# Patient Record
Sex: FEMALE | Race: WHITE | NOT HISPANIC OR LATINO | Employment: FULL TIME | RURAL
[De-identification: names, ages, dates, MRNs, and addresses within clinical notes are randomized per-mention and may not be internally consistent; named-entity substitution may affect disease eponyms.]

---

## 2020-10-19 ENCOUNTER — HISTORICAL (OUTPATIENT)
Dept: ADMINISTRATIVE | Facility: HOSPITAL | Age: 30
End: 2020-10-19

## 2020-10-19 LAB — SARS-COV+SARS-COV-2 AG RESP QL IA.RAPID: NEGATIVE

## 2020-10-21 LAB
REPORT: NORMAL

## 2021-01-22 ENCOUNTER — HISTORICAL (OUTPATIENT)
Dept: ADMINISTRATIVE | Facility: HOSPITAL | Age: 31
End: 2021-01-22

## 2021-01-22 LAB
25(OH)D3 SERPL-MCNC: 25 NG/ML
ALBUMIN SERPL BCP-MCNC: 4.1 G/DL (ref 3.5–5)
ALBUMIN/GLOB SERPL: 1.1 {RATIO}
ALP SERPL-CCNC: 65 U/L (ref 37–98)
ALT SERPL W P-5'-P-CCNC: 25 U/L (ref 13–56)
ANION GAP SERPL CALCULATED.3IONS-SCNC: 7 MMOL/L (ref 7–16)
AST SERPL W P-5'-P-CCNC: 20 U/L (ref 15–37)
BASOPHILS # BLD AUTO: 0.03 X10E3/UL (ref 0–0.2)
BASOPHILS NFR BLD AUTO: 0.5 % (ref 0–1)
BILIRUB SERPL-MCNC: 0.3 MG/DL (ref 0–1.2)
BUN SERPL-MCNC: 13 MG/DL (ref 7–18)
BUN/CREAT SERPL: 14
CALCIUM SERPL-MCNC: 8.8 MG/DL (ref 8.5–10.1)
CHLORIDE SERPL-SCNC: 111 MMOL/L (ref 98–107)
CHOLEST SERPL-MCNC: 141 MG/DL
CO2 SERPL-SCNC: 29 MMOL/L (ref 21–32)
CREAT SERPL-MCNC: 0.91 MG/DL (ref 0.5–1.02)
EOSINOPHIL # BLD AUTO: 0.06 X10E3/UL (ref 0–0.5)
EOSINOPHIL NFR BLD AUTO: 1 % (ref 1–4)
ERYTHROCYTE [DISTWIDTH] IN BLOOD BY AUTOMATED COUNT: 11.9 % (ref 11.5–14.5)
GLOBULIN SER-MCNC: 3.6 G/DL (ref 2–4)
GLUCOSE SERPL-MCNC: 78 MG/DL (ref 74–106)
HCT VFR BLD AUTO: 43.8 % (ref 38–47)
HDLC SERPL-MCNC: 60 MG/DL
HGB BLD-MCNC: 14.3 G/DL (ref 12–16)
IMM GRANULOCYTES # BLD AUTO: 0.02 X10E3/UL (ref 0–0.04)
IMM GRANULOCYTES NFR BLD: 0.3 % (ref 0–0.4)
LDLC SERPL CALC-MCNC: 67 MG/DL
LYMPHOCYTES # BLD AUTO: 1.68 X10E3/UL (ref 1–4.8)
LYMPHOCYTES NFR BLD AUTO: 28 % (ref 27–41)
MCH RBC QN AUTO: 32.6 PG (ref 27–31)
MCHC RBC AUTO-ENTMCNC: 32.6 G/DL (ref 32–36)
MCV RBC AUTO: 99.8 FL (ref 80–96)
MONOCYTES # BLD AUTO: 0.34 X10E3/UL (ref 0–0.8)
MONOCYTES NFR BLD AUTO: 5.7 % (ref 2–6)
MPC BLD CALC-MCNC: 11.1 FL (ref 9.4–12.4)
NEUTROPHILS # BLD AUTO: 3.86 X10E3/UL (ref 1.8–7.7)
NEUTROPHILS NFR BLD AUTO: 64.5 % (ref 53–65)
NONHDLC SERPL-MCNC: 81 MG/DL
NRBC # BLD AUTO: 0 X10E3/UL (ref 0–0)
NRBC, AUTO (.00): 0 /100 (ref 0–0)
PLATELET # BLD AUTO: 264 X10E3/UL (ref 150–400)
POTASSIUM SERPL-SCNC: 4.3 MMOL/L (ref 3.5–5.1)
PROT SERPL-MCNC: 7.7 G/DL (ref 6.4–8.2)
RBC # BLD AUTO: 4.39 X10E6/UL (ref 4.2–5.4)
SODIUM SERPL-SCNC: 143 MMOL/L (ref 136–145)
TRIGL SERPL-MCNC: 71 MG/DL
TSH SERPL DL<=0.005 MIU/L-ACNC: 0.91 UIU/ML (ref 0.36–3.74)
VIT B12 SERPL-MCNC: 415 PG/ML (ref 193–986)
VLDLC SERPL-MCNC: 14 MG/DL
WBC # BLD AUTO: 5.99 X10E3/UL (ref 4.5–11)

## 2021-11-04 ENCOUNTER — INFUSION (OUTPATIENT)
Dept: INFECTIOUS DISEASES | Facility: HOSPITAL | Age: 31
End: 2021-11-04
Attending: NURSE PRACTITIONER
Payer: COMMERCIAL

## 2021-11-04 VITALS
SYSTOLIC BLOOD PRESSURE: 126 MMHG | OXYGEN SATURATION: 99 % | BODY MASS INDEX: 30.12 KG/M2 | HEART RATE: 90 BPM | DIASTOLIC BLOOD PRESSURE: 88 MMHG | HEIGHT: 63 IN | WEIGHT: 170 LBS | RESPIRATION RATE: 18 BRPM | TEMPERATURE: 98 F

## 2021-11-04 DIAGNOSIS — U07.1 COVID-19 VIRUS INFECTION: Primary | ICD-10-CM

## 2021-11-04 PROCEDURE — 63600175 PHARM REV CODE 636 W HCPCS: Performed by: NURSE PRACTITIONER

## 2021-11-04 PROCEDURE — M0243 CASIRIVI AND IMDEVI INFUSION: HCPCS | Performed by: NURSE PRACTITIONER

## 2021-11-04 PROCEDURE — 25000003 PHARM REV CODE 250: Performed by: NURSE PRACTITIONER

## 2021-11-04 RX ORDER — ALBUTEROL SULFATE 90 UG/1
2 AEROSOL, METERED RESPIRATORY (INHALATION)
Status: DISCONTINUED | OUTPATIENT
Start: 2021-11-04 | End: 2024-02-08

## 2021-11-04 RX ORDER — EPINEPHRINE 0.3 MG/.3ML
0.3 INJECTION SUBCUTANEOUS
Status: DISCONTINUED | OUTPATIENT
Start: 2021-11-04 | End: 2024-02-08

## 2021-11-04 RX ORDER — ONDANSETRON 4 MG/1
4 TABLET, ORALLY DISINTEGRATING ORAL ONCE AS NEEDED
Status: DISCONTINUED | OUTPATIENT
Start: 2021-11-04 | End: 2024-02-08

## 2021-11-04 RX ORDER — DIPHENHYDRAMINE HYDROCHLORIDE 50 MG/ML
25 INJECTION INTRAMUSCULAR; INTRAVENOUS ONCE AS NEEDED
Status: DISCONTINUED | OUTPATIENT
Start: 2021-11-04 | End: 2024-02-08

## 2021-11-04 RX ORDER — BUPROPION HYDROCHLORIDE 300 MG/1
300 TABLET ORAL DAILY
COMMUNITY
End: 2023-09-28 | Stop reason: SDUPTHER

## 2021-11-04 RX ORDER — SODIUM CHLORIDE 0.9 % (FLUSH) 0.9 %
10 SYRINGE (ML) INJECTION
Status: DISCONTINUED | OUTPATIENT
Start: 2021-11-04 | End: 2024-02-08

## 2021-11-04 RX ORDER — ACETAMINOPHEN 325 MG/1
650 TABLET ORAL ONCE AS NEEDED
Status: DISCONTINUED | OUTPATIENT
Start: 2021-11-04 | End: 2024-02-08

## 2021-11-04 RX ADMIN — CASIRIVIMAB AND IMDEVIMAB 600 MG: 600; 600 INJECTION, SOLUTION, CONCENTRATE INTRAVENOUS at 03:11

## 2021-12-09 ENCOUNTER — HOSPITAL ENCOUNTER (OUTPATIENT)
Dept: RADIOLOGY | Facility: HOSPITAL | Age: 31
Discharge: HOME | End: 2021-12-09
Attending: OBSTETRICS & GYNECOLOGY
Payer: COMMERCIAL

## 2021-12-09 VITALS — BODY MASS INDEX: 31.01 KG/M2 | WEIGHT: 175 LBS | HEIGHT: 63 IN

## 2021-12-09 DIAGNOSIS — N63.0 BREAST LUMP: ICD-10-CM

## 2021-12-09 PROCEDURE — 76641 ULTRASOUND BREAST COMPLETE: CPT | Mod: 26,LT,, | Performed by: RADIOLOGY

## 2021-12-09 PROCEDURE — 77066 DX MAMMO INCL CAD BI: CPT | Mod: 26,,, | Performed by: RADIOLOGY

## 2021-12-09 PROCEDURE — 77066 MAMMO DIGITAL DIAGNOSTIC BILAT: ICD-10-PCS | Mod: 26,,, | Performed by: RADIOLOGY

## 2021-12-09 PROCEDURE — 76641 ULTRASOUND BREAST COMPLETE: CPT | Mod: TC,50

## 2021-12-09 PROCEDURE — 76641 PR US BREAST COMPLETE UNILAT: ICD-10-PCS | Mod: 26,RT,, | Performed by: RADIOLOGY

## 2021-12-09 PROCEDURE — 77066 DX MAMMO INCL CAD BI: CPT | Mod: TC

## 2021-12-09 PROCEDURE — 76641 ULTRASOUND BREAST COMPLETE: CPT | Mod: 26,RT,, | Performed by: RADIOLOGY

## 2023-09-06 DIAGNOSIS — Z01.818 PREOP TESTING: Primary | ICD-10-CM

## 2023-09-28 RX ORDER — BUPROPION HYDROCHLORIDE 300 MG/1
300 TABLET ORAL DAILY
Qty: 90 TABLET | Refills: 3 | Status: SHIPPED | OUTPATIENT
Start: 2023-09-28

## 2023-11-14 RX ORDER — AMOXICILLIN AND CLAVULANATE POTASSIUM 875; 125 MG/1; MG/1
1 TABLET, FILM COATED ORAL EVERY 12 HOURS
Qty: 14 TABLET | Refills: 0 | Status: SHIPPED | OUTPATIENT
Start: 2023-11-14 | End: 2023-11-21

## 2023-12-12 ENCOUNTER — OFFICE VISIT (OUTPATIENT)
Dept: OBSTETRICS AND GYNECOLOGY | Facility: CLINIC | Age: 33
End: 2023-12-12
Payer: COMMERCIAL

## 2023-12-12 VITALS
HEIGHT: 63 IN | WEIGHT: 198 LBS | OXYGEN SATURATION: 100 % | SYSTOLIC BLOOD PRESSURE: 112 MMHG | DIASTOLIC BLOOD PRESSURE: 78 MMHG | RESPIRATION RATE: 18 BRPM | BODY MASS INDEX: 35.08 KG/M2 | HEART RATE: 87 BPM

## 2023-12-12 DIAGNOSIS — E66.9 CLASS 2 OBESITY WITH BODY MASS INDEX (BMI) OF 35.0 TO 35.9 IN ADULT, UNSPECIFIED OBESITY TYPE, UNSPECIFIED WHETHER SERIOUS COMORBIDITY PRESENT: Primary | ICD-10-CM

## 2023-12-12 DIAGNOSIS — Z71.3 DIETARY COUNSELING AND SURVEILLANCE: ICD-10-CM

## 2023-12-12 DIAGNOSIS — R63.5 WEIGHT GAIN: ICD-10-CM

## 2023-12-12 PROCEDURE — 99214 OFFICE O/P EST MOD 30 MIN: CPT | Mod: PBBFAC | Performed by: ADVANCED PRACTICE MIDWIFE

## 2023-12-12 PROCEDURE — 1159F PR MEDICATION LIST DOCUMENTED IN MEDICAL RECORD: ICD-10-PCS | Mod: ,,, | Performed by: ADVANCED PRACTICE MIDWIFE

## 2023-12-12 PROCEDURE — 1159F MED LIST DOCD IN RCRD: CPT | Mod: ,,, | Performed by: ADVANCED PRACTICE MIDWIFE

## 2023-12-12 PROCEDURE — 99203 PR OFFICE/OUTPT VISIT, NEW, LEVL III, 30-44 MIN: ICD-10-PCS | Mod: S$PBB,,, | Performed by: ADVANCED PRACTICE MIDWIFE

## 2023-12-12 PROCEDURE — 3074F SYST BP LT 130 MM HG: CPT | Mod: ,,, | Performed by: ADVANCED PRACTICE MIDWIFE

## 2023-12-12 PROCEDURE — 3008F PR BODY MASS INDEX (BMI) DOCUMENTED: ICD-10-PCS | Mod: ,,, | Performed by: ADVANCED PRACTICE MIDWIFE

## 2023-12-12 PROCEDURE — 3008F BODY MASS INDEX DOCD: CPT | Mod: ,,, | Performed by: ADVANCED PRACTICE MIDWIFE

## 2023-12-12 PROCEDURE — 3074F PR MOST RECENT SYSTOLIC BLOOD PRESSURE < 130 MM HG: ICD-10-PCS | Mod: ,,, | Performed by: ADVANCED PRACTICE MIDWIFE

## 2023-12-12 PROCEDURE — 99203 OFFICE O/P NEW LOW 30 MIN: CPT | Mod: S$PBB,,, | Performed by: ADVANCED PRACTICE MIDWIFE

## 2023-12-12 PROCEDURE — 3078F DIAST BP <80 MM HG: CPT | Mod: ,,, | Performed by: ADVANCED PRACTICE MIDWIFE

## 2023-12-12 PROCEDURE — 3078F PR MOST RECENT DIASTOLIC BLOOD PRESSURE < 80 MM HG: ICD-10-PCS | Mod: ,,, | Performed by: ADVANCED PRACTICE MIDWIFE

## 2023-12-12 RX ORDER — PHENTERMINE HYDROCHLORIDE 37.5 MG/1
37.5 TABLET ORAL
Qty: 30 TABLET | Refills: 0 | Status: SHIPPED | OUTPATIENT
Start: 2023-12-12 | End: 2024-01-11

## 2023-12-12 RX ORDER — FLECAINIDE ACETATE 50 MG/1
50 TABLET ORAL 2 TIMES DAILY
COMMUNITY
Start: 2023-09-13 | End: 2024-09-12

## 2023-12-12 RX ORDER — METOPROLOL SUCCINATE 50 MG/1
50 TABLET, EXTENDED RELEASE ORAL
COMMUNITY
Start: 2023-09-13 | End: 2024-09-12

## 2023-12-12 RX ORDER — PHENTERMINE HYDROCHLORIDE 37.5 MG/1
37.5 TABLET ORAL
Qty: 30 TABLET | Refills: 0 | Status: CANCELLED | OUTPATIENT
Start: 2023-12-12 | End: 2024-01-11

## 2023-12-12 NOTE — PROGRESS NOTES
Subjective     Patient ID: Genoveva Pate is a 33 y.o. female.    Chief Complaint: Weight Loss (In for Adipex. )    C/O weight gain and requesting Adipex for weight mgt  Has taken in the past without complications  Pt discussed with her cardiologist and he okayed her taking Adipex      Review of Systems   Constitutional: Negative.    HENT: Negative.     Eyes: Negative.    Respiratory: Negative.     Cardiovascular: Negative.    Gastrointestinal: Negative.    Endocrine: Negative.    Genitourinary: Negative.    Musculoskeletal: Negative.    Integumentary:  Negative.   Allergic/Immunologic: Negative.    Neurological: Negative.    Psychiatric/Behavioral: Negative.            Objective     Physical Exam  Vitals reviewed.   Constitutional:       Appearance: Normal appearance. She is obese.   HENT:      Head: Normocephalic.   Cardiovascular:      Rate and Rhythm: Normal rate and regular rhythm.   Pulmonary:      Effort: Pulmonary effort is normal.      Breath sounds: Normal breath sounds.   Abdominal:      General: Abdomen is flat.      Palpations: Abdomen is soft.   Musculoskeletal:         General: Normal range of motion.      Cervical back: Normal range of motion.   Skin:     General: Skin is warm and dry.   Neurological:      Mental Status: She is alert and oriented to person, place, and time.   Psychiatric:         Mood and Affect: Mood normal.         Behavior: Behavior normal.            Assessment and Plan     1. Class 2 obesity with body mass index (BMI) of 35.0 to 35.9 in adult, unspecified obesity type, unspecified whether serious comorbidity present    2. Weight gain    3. Dietary counseling and surveillance    Other orders  -     phentermine (ADIPEX-P) 37.5 mg tablet; Take 1 tablet (37.5 mg total) by mouth before breakfast.  Dispense: 30 tablet; Refill: 0        Discussed diet/exercise in detail.  Recommend no processed carbs, increase water intake  Discussed effects and poss side effects of Adipex.   Stop  taking for chest pain, SOB or sustained elevated heartrate         Follow up in about 4 weeks (around 1/9/2024) for Medication Follow Up.

## 2024-01-25 ENCOUNTER — OFFICE VISIT (OUTPATIENT)
Dept: OBSTETRICS AND GYNECOLOGY | Facility: CLINIC | Age: 34
End: 2024-01-25
Payer: COMMERCIAL

## 2024-01-25 VITALS
HEART RATE: 98 BPM | SYSTOLIC BLOOD PRESSURE: 112 MMHG | BODY MASS INDEX: 34.2 KG/M2 | WEIGHT: 193 LBS | HEIGHT: 63 IN | RESPIRATION RATE: 19 BRPM | DIASTOLIC BLOOD PRESSURE: 70 MMHG | OXYGEN SATURATION: 100 %

## 2024-01-25 DIAGNOSIS — Z76.89 ENCOUNTER FOR WEIGHT MANAGEMENT: ICD-10-CM

## 2024-01-25 DIAGNOSIS — E66.9 OBESITY (BMI 30.0-34.9): Primary | ICD-10-CM

## 2024-01-25 DIAGNOSIS — Z71.3 DIETARY COUNSELING AND SURVEILLANCE: ICD-10-CM

## 2024-01-25 PROCEDURE — 3074F SYST BP LT 130 MM HG: CPT | Mod: ,,, | Performed by: ADVANCED PRACTICE MIDWIFE

## 2024-01-25 PROCEDURE — 99214 OFFICE O/P EST MOD 30 MIN: CPT | Mod: PBBFAC | Performed by: ADVANCED PRACTICE MIDWIFE

## 2024-01-25 PROCEDURE — 99213 OFFICE O/P EST LOW 20 MIN: CPT | Mod: S$PBB,,, | Performed by: ADVANCED PRACTICE MIDWIFE

## 2024-01-25 PROCEDURE — 1159F MED LIST DOCD IN RCRD: CPT | Mod: ,,, | Performed by: ADVANCED PRACTICE MIDWIFE

## 2024-01-25 PROCEDURE — 3078F DIAST BP <80 MM HG: CPT | Mod: ,,, | Performed by: ADVANCED PRACTICE MIDWIFE

## 2024-01-25 PROCEDURE — 3008F BODY MASS INDEX DOCD: CPT | Mod: ,,, | Performed by: ADVANCED PRACTICE MIDWIFE

## 2024-01-25 RX ORDER — ASPIRIN 81 MG/1
81 TABLET ORAL
COMMUNITY

## 2024-01-25 RX ORDER — PHENTERMINE HYDROCHLORIDE 37.5 MG/1
37.5 TABLET ORAL
Qty: 30 TABLET | Refills: 0 | Status: SHIPPED | OUTPATIENT
Start: 2024-01-25 | End: 2024-02-24

## 2024-01-25 RX ORDER — IBUPROFEN 100 MG/5ML
1000 SUSPENSION, ORAL (FINAL DOSE FORM) ORAL
COMMUNITY

## 2024-01-25 NOTE — PROGRESS NOTES
Subjective     Patient ID: Geonveva Pate is a 33 y.o. female.    Chief Complaint: No chief complaint on file.    In for weight management  5 pound weight loss in the last month  Breakfast: nothing  Lunch: Lean cuisine or beef stick, cheese, pickles  Dinner: grilled meat and vegetable  Drinking approx 60 oz water/day  Tolerating Adipex without complications      Review of Systems   Constitutional: Negative.    HENT: Negative.     Eyes: Negative.    Respiratory: Negative.     Cardiovascular: Negative.    Gastrointestinal: Negative.    Endocrine: Negative.    Genitourinary: Negative.    Musculoskeletal: Negative.    Integumentary:  Negative.   Allergic/Immunologic: Negative.    Neurological: Negative.    Psychiatric/Behavioral: Negative.            Objective     Physical Exam  Vitals reviewed.   Constitutional:       Appearance: Normal appearance.   HENT:      Head: Normocephalic.   Cardiovascular:      Rate and Rhythm: Normal rate and regular rhythm.   Pulmonary:      Effort: Pulmonary effort is normal.      Breath sounds: Normal breath sounds.   Abdominal:      General: Abdomen is flat.      Palpations: Abdomen is soft.   Musculoskeletal:         General: Normal range of motion.      Cervical back: Normal range of motion.   Skin:     General: Skin is warm and dry.   Neurological:      Mental Status: She is alert and oriented to person, place, and time.   Psychiatric:         Mood and Affect: Mood normal.         Behavior: Behavior normal.            Assessment and Plan     1. Obesity (BMI 30.0-34.9)  -     phentermine (ADIPEX-P) 37.5 mg tablet; Take 1 tablet (37.5 mg total) by mouth before breakfast.  Dispense: 30 tablet; Refill: 0    2. Dietary counseling and surveillance  -     phentermine (ADIPEX-P) 37.5 mg tablet; Take 1 tablet (37.5 mg total) by mouth before breakfast.  Dispense: 30 tablet; Refill: 0    3. Encounter for weight management  -     phentermine (ADIPEX-P) 37.5 mg tablet; Take 1 tablet (37.5 mg  total) by mouth before breakfast.  Dispense: 30 tablet; Refill: 0        MS  reviewed  Discussed diet recall       Follow up in about 1 month (around 2/25/2024) for Med Follow Up.

## 2024-02-08 ENCOUNTER — OFFICE VISIT (OUTPATIENT)
Dept: PRIMARY CARE CLINIC | Facility: CLINIC | Age: 34
End: 2024-02-08
Payer: COMMERCIAL

## 2024-02-08 VITALS
DIASTOLIC BLOOD PRESSURE: 68 MMHG | TEMPERATURE: 99 F | HEIGHT: 63 IN | HEART RATE: 88 BPM | BODY MASS INDEX: 35.44 KG/M2 | SYSTOLIC BLOOD PRESSURE: 102 MMHG | OXYGEN SATURATION: 98 % | WEIGHT: 200 LBS

## 2024-02-08 DIAGNOSIS — R50.9 FEVER, UNSPECIFIED FEVER CAUSE: ICD-10-CM

## 2024-02-08 DIAGNOSIS — U07.1 COVID-19: Primary | ICD-10-CM

## 2024-02-08 LAB
CTP QC/QA: YES
CTP QC/QA: YES
FLUAV AG NPH QL: NEGATIVE
FLUBV AG NPH QL: NEGATIVE
SARS-COV-2 AG RESP QL IA.RAPID: POSITIVE

## 2024-02-08 PROCEDURE — 1159F MED LIST DOCD IN RCRD: CPT | Mod: ,,, | Performed by: NURSE PRACTITIONER

## 2024-02-08 PROCEDURE — 87426 SARSCOV CORONAVIRUS AG IA: CPT | Mod: QW,,, | Performed by: NURSE PRACTITIONER

## 2024-02-08 PROCEDURE — 3078F DIAST BP <80 MM HG: CPT | Mod: ,,, | Performed by: NURSE PRACTITIONER

## 2024-02-08 PROCEDURE — 87804 INFLUENZA ASSAY W/OPTIC: CPT | Mod: QW,,, | Performed by: NURSE PRACTITIONER

## 2024-02-08 PROCEDURE — 99203 OFFICE O/P NEW LOW 30 MIN: CPT | Mod: ,,, | Performed by: NURSE PRACTITIONER

## 2024-02-08 PROCEDURE — 3008F BODY MASS INDEX DOCD: CPT | Mod: ,,, | Performed by: NURSE PRACTITIONER

## 2024-02-08 PROCEDURE — 3074F SYST BP LT 130 MM HG: CPT | Mod: ,,, | Performed by: NURSE PRACTITIONER

## 2024-02-08 PROCEDURE — 1160F RVW MEDS BY RX/DR IN RCRD: CPT | Mod: ,,, | Performed by: NURSE PRACTITIONER

## 2024-02-08 NOTE — PROGRESS NOTES
Subjective     Patient ID: Genoveva Pate is a 33 y.o. female.    Chief Complaint: Cough, Headache, Generalized Body Aches (X2 days), and Sore Throat    Pt presents with flu like symptoms since yesterday.      Review of Systems   Constitutional:  Positive for fatigue and fever. Negative for activity change, appetite change and chills.   HENT:  Positive for sore throat. Negative for nasal congestion, ear discharge, nosebleeds, postnasal drip, rhinorrhea, sinus pressure/congestion, sneezing and tinnitus.    Eyes:  Negative for pain, discharge, redness and itching.   Respiratory:  Positive for cough. Negative for choking, chest tightness, shortness of breath and wheezing.    Cardiovascular:  Negative for chest pain.   Gastrointestinal:  Negative for abdominal distention, abdominal pain, blood in stool, change in bowel habit, constipation, diarrhea, nausea and vomiting.   Genitourinary:  Negative for decreased urine volume, dysuria, flank pain, frequency, menstrual irregularity and menstrual problem.   Musculoskeletal:  Positive for arthralgias. Negative for back pain and gait problem.   Integumentary:  Negative for wound, breast mass and breast discharge.   Allergic/Immunologic: Negative for immunocompromised state.   Neurological:  Positive for headaches. Negative for dizziness and light-headedness.   Psychiatric/Behavioral:  Negative for agitation, behavioral problems and hallucinations.    Breast: Negative for mass         Objective     Physical Exam  Vitals and nursing note reviewed.   Constitutional:       Appearance: Normal appearance.   Cardiovascular:      Rate and Rhythm: Normal rate and regular rhythm.      Heart sounds: Normal heart sounds.   Pulmonary:      Effort: Pulmonary effort is normal.      Breath sounds: Normal breath sounds.   Musculoskeletal:         General: Normal range of motion.   Neurological:      Mental Status: She is alert and oriented to person, place, and time.   Psychiatric:          Mood and Affect: Mood normal.         Behavior: Behavior normal.            Assessment and Plan     1. COVID-19    2. Fever, unspecified fever cause  -     SARS Coronavirus 2 Antigen, POCT  -     POCT Influenza A/B Rapid Antigen        Quarantine 5 days starting yesterday. Tylenol if needed and drink plenty of fluids.          Follow up if symptoms worsen or fail to improve.

## 2024-02-08 NOTE — LETTER
February 8, 2024      [facility]  [address] [place] [address]  Phone: [phone]  Fax: [phone]       Patient: Genoveva Pate   YOB: 1990  Date of Visit: 02/08/2024    To Whom It May Concern:    Reuben Pate  was at [facility] on 02/08/2024. The patient may return to work/school on 2/12/2024 with no restrictions. If you have any questions or concerns, or if I can be of further assistance, please do not hesitate to contact me.    Sincerely,    BARBARA Vincent

## 2024-03-20 RX ORDER — CYCLOBENZAPRINE HCL 10 MG
10 TABLET ORAL 3 TIMES DAILY PRN
Qty: 30 TABLET | Refills: 0 | Status: SHIPPED | OUTPATIENT
Start: 2024-03-20 | End: 2024-03-30

## 2024-05-30 DIAGNOSIS — E66.9 CLASS 1 OBESITY WITH BODY MASS INDEX (BMI) OF 30.0 TO 30.9 IN ADULT, UNSPECIFIED OBESITY TYPE, UNSPECIFIED WHETHER SERIOUS COMORBIDITY PRESENT: Primary | ICD-10-CM

## 2024-05-30 RX ORDER — TIRZEPATIDE 2.5 MG/.5ML
2.5 INJECTION, SOLUTION SUBCUTANEOUS
Qty: 2 ML | Refills: 0 | Status: SHIPPED | OUTPATIENT
Start: 2024-05-30 | End: 2024-06-29

## 2024-06-11 ENCOUNTER — HOSPITAL ENCOUNTER (EMERGENCY)
Facility: HOSPITAL | Age: 34
Discharge: HOME | End: 2024-06-11
Attending: EMERGENCY MEDICINE
Payer: COMMERCIAL

## 2024-06-11 VITALS
HEIGHT: 63 IN | RESPIRATION RATE: 14 BRPM | SYSTOLIC BLOOD PRESSURE: 112 MMHG | HEART RATE: 97 BPM | OXYGEN SATURATION: 99 % | BODY MASS INDEX: 36.32 KG/M2 | TEMPERATURE: 98 F | WEIGHT: 205 LBS | DIASTOLIC BLOOD PRESSURE: 83 MMHG

## 2024-06-11 DIAGNOSIS — R06.02 SHORTNESS OF BREATH: ICD-10-CM

## 2024-06-11 DIAGNOSIS — R00.0 TACHYCARDIA: Primary | ICD-10-CM

## 2024-06-11 LAB
ALBUMIN SERPL BCP-MCNC: 3.9 G/DL (ref 3.5–5)
ALBUMIN/GLOB SERPL: 1 {RATIO}
ALP SERPL-CCNC: 81 U/L (ref 37–98)
ALT SERPL W P-5'-P-CCNC: 80 U/L (ref 13–56)
ANION GAP SERPL CALCULATED.3IONS-SCNC: 12 MMOL/L (ref 7–16)
AST SERPL W P-5'-P-CCNC: 32 U/L (ref 15–37)
BASOPHILS # BLD AUTO: 0.04 K/UL (ref 0–0.2)
BASOPHILS NFR BLD AUTO: 0.5 % (ref 0–1)
BILIRUB SERPL-MCNC: 0.4 MG/DL (ref ?–1.2)
BUN SERPL-MCNC: 8 MG/DL (ref 7–18)
BUN/CREAT SERPL: 8 (ref 6–20)
CALCIUM SERPL-MCNC: 9.3 MG/DL (ref 8.5–10.1)
CHLORIDE SERPL-SCNC: 103 MMOL/L (ref 98–107)
CO2 SERPL-SCNC: 28 MMOL/L (ref 21–32)
CREAT SERPL-MCNC: 0.95 MG/DL (ref 0.55–1.02)
DIFFERENTIAL METHOD BLD: ABNORMAL
EGFR (NO RACE VARIABLE) (RUSH/TITUS): 82 ML/MIN/1.73M2
EOSINOPHIL # BLD AUTO: 0.07 K/UL (ref 0–0.5)
EOSINOPHIL NFR BLD AUTO: 0.9 % (ref 1–4)
ERYTHROCYTE [DISTWIDTH] IN BLOOD BY AUTOMATED COUNT: 11.9 % (ref 11.5–14.5)
GLOBULIN SER-MCNC: 4 G/DL (ref 2–4)
GLUCOSE SERPL-MCNC: 104 MG/DL (ref 74–106)
HCT VFR BLD AUTO: 44.4 % (ref 38–47)
HGB BLD-MCNC: 14.6 G/DL (ref 12–16)
IMM GRANULOCYTES # BLD AUTO: 0.02 K/UL (ref 0–0.04)
IMM GRANULOCYTES NFR BLD: 0.3 % (ref 0–0.4)
LYMPHOCYTES # BLD AUTO: 2.48 K/UL (ref 1–4.8)
LYMPHOCYTES NFR BLD AUTO: 31.5 % (ref 27–41)
MAGNESIUM SERPL-MCNC: 2.1 MG/DL (ref 1.7–2.3)
MCH RBC QN AUTO: 32.5 PG (ref 27–31)
MCHC RBC AUTO-ENTMCNC: 32.9 G/DL (ref 32–36)
MCV RBC AUTO: 98.9 FL (ref 80–96)
MONOCYTES # BLD AUTO: 0.51 K/UL (ref 0–0.8)
MONOCYTES NFR BLD AUTO: 6.5 % (ref 2–6)
MPC BLD CALC-MCNC: 11.1 FL (ref 9.4–12.4)
NEUTROPHILS # BLD AUTO: 4.76 K/UL (ref 1.8–7.7)
NEUTROPHILS NFR BLD AUTO: 60.3 % (ref 53–65)
NRBC # BLD AUTO: 0 X10E3/UL
NRBC, AUTO (.00): 0 %
NT-PROBNP SERPL-MCNC: 34 PG/ML (ref 1–125)
PLATELET # BLD AUTO: 286 K/UL (ref 150–400)
POTASSIUM SERPL-SCNC: 3.5 MMOL/L (ref 3.5–5.1)
PROT SERPL-MCNC: 7.9 G/DL (ref 6.4–8.2)
RBC # BLD AUTO: 4.49 M/UL (ref 4.2–5.4)
SODIUM SERPL-SCNC: 139 MMOL/L (ref 136–145)
TROPONIN I SERPL DL<=0.01 NG/ML-MCNC: <4 PG/ML
WBC # BLD AUTO: 7.88 K/UL (ref 4.5–11)

## 2024-06-11 PROCEDURE — 96374 THER/PROPH/DIAG INJ IV PUSH: CPT

## 2024-06-11 PROCEDURE — 83880 ASSAY OF NATRIURETIC PEPTIDE: CPT | Performed by: EMERGENCY MEDICINE

## 2024-06-11 PROCEDURE — 84484 ASSAY OF TROPONIN QUANT: CPT | Performed by: EMERGENCY MEDICINE

## 2024-06-11 PROCEDURE — 93005 ELECTROCARDIOGRAM TRACING: CPT

## 2024-06-11 PROCEDURE — 99285 EMERGENCY DEPT VISIT HI MDM: CPT | Mod: 25

## 2024-06-11 PROCEDURE — 85025 COMPLETE CBC W/AUTO DIFF WBC: CPT | Performed by: EMERGENCY MEDICINE

## 2024-06-11 PROCEDURE — 83735 ASSAY OF MAGNESIUM: CPT | Performed by: EMERGENCY MEDICINE

## 2024-06-11 PROCEDURE — 80053 COMPREHEN METABOLIC PANEL: CPT | Performed by: EMERGENCY MEDICINE

## 2024-06-11 PROCEDURE — 25000003 PHARM REV CODE 250: Performed by: EMERGENCY MEDICINE

## 2024-06-11 PROCEDURE — 36415 COLL VENOUS BLD VENIPUNCTURE: CPT | Performed by: EMERGENCY MEDICINE

## 2024-06-11 RX ORDER — DILTIAZEM HYDROCHLORIDE 5 MG/ML
20 INJECTION INTRAVENOUS
Status: COMPLETED | OUTPATIENT
Start: 2024-06-11 | End: 2024-06-11

## 2024-06-11 RX ADMIN — DILTIAZEM HYDROCHLORIDE 20 MG: 5 INJECTION INTRAVENOUS at 02:06

## 2024-06-11 NOTE — DISCHARGE INSTRUCTIONS
Continue current medications as prescribed.  Call your cardiology to discuss potential medication adjustment.  Return to emergency department for any worsening or further problems

## 2024-06-11 NOTE — ED PROVIDER NOTES
Encounter Date: 6/11/2024    SCRIBE #1 NOTE: I, Klaudia Hernandez, am scribing for, and in the presence of,  Chidi Malik MD.       History     Chief Complaint   Patient presents with    Shortness of Breath     Pt presents to ed with c/o SOB and elevated HR. Hx of 2 ablation and afib     Pt is a 33 y.o. Female that presents to the ED with c/o of SOB and elevated HR. Pt states she has had 2 Ablations done last year. Pt states she has Mhx of A-Fib. Pt states her Left arm started to hurt and she had chest pain and SOB. The chest pain started 6/10/2024. Pt also states that her HR would increase rapidly from . PMHx consists of Ching-Parkinson-White (WPW) syndrome. Pt take 50 of metoprolol and 100    The history is provided by the patient. No  was used.     Review of patient's allergies indicates:  No Known Allergies  Past Medical History:   Diagnosis Date    Ching-Parkinson-White (WPW) syndrome      Past Surgical History:   Procedure Laterality Date    CARDIAC ELECTROPHYSIOLOGY MAPPING AND ABLATION      TOTAL REDUCTION MAMMOPLASTY       Family History   Problem Relation Name Age of Onset    Breast cancer Neg Hx      Colon cancer Neg Hx      Ovarian cancer Neg Hx       Social History     Tobacco Use    Smoking status: Never    Smokeless tobacco: Never   Substance Use Topics    Alcohol use: Yes     Alcohol/week: 1.0 standard drink of alcohol     Types: 1 Glasses of wine per week    Drug use: Never     Review of Systems   Constitutional:  Negative for fever.   Respiratory:  Positive for chest tightness.    Gastrointestinal:  Negative for nausea.   All other systems reviewed and are negative.      Physical Exam     Initial Vitals [06/11/24 1417]   BP Pulse Resp Temp SpO2   (!) 130/97 (!) 122 14 98.4 °F (36.9 °C) 97 %      MAP       --         Physical Exam    Nursing note and vitals reviewed.  HENT:   Head: Normocephalic and atraumatic.   Mouth/Throat: Oropharynx is clear and moist.   Eyes: Pupils are  equal, round, and reactive to light.   Neck: Neck supple.   Normal range of motion.  Cardiovascular:    Tachycardia present.         Pulmonary/Chest: Effort normal and breath sounds normal.   Abdominal: Abdomen is soft. She exhibits no distension.   Musculoskeletal:         General: Normal range of motion.      Cervical back: Normal range of motion and neck supple.     Neurological: She is alert.   Skin: Skin is warm. Capillary refill takes less than 2 seconds.   Psychiatric: She has a normal mood and affect.         ED Course   Procedures  Labs Reviewed   COMPREHENSIVE METABOLIC PANEL - Abnormal; Notable for the following components:       Result Value    ALT 80 (*)     All other components within normal limits   CBC WITH DIFFERENTIAL - Abnormal; Notable for the following components:    MCV 98.9 (*)     MCH 32.5 (*)     Monocytes % 6.5 (*)     Eosinophils % 0.9 (*)     All other components within normal limits   TROPONIN I - Normal   NT-PRO NATRIURETIC PEPTIDE - Normal   MAGNESIUM - Normal   CBC W/ AUTO DIFFERENTIAL    Narrative:     The following orders were created for panel order CBC auto differential.  Procedure                               Abnormality         Status                     ---------                               -----------         ------                     CBC with Differential[[id number]]        Abnormal            Final result                 Please view results for these tests on the individual orders.     EKG Readings: (Independently Interpreted)   Heart Rate: 120 bpm.   Sinus tachycardia with PAC(s)   rSr'(V1) - probable normal variant   Widespread ST-T abnormality may be due to myocardial ischemia   Abnormal ECG       Imaging Results              X-Ray Chest AP Portable (Final result)  Result time 06/11/24 14:44:33      Final result by Yung Yee II, MD (06/11/24 14:44:33)                   Impression:      No evidence of cardiopulmonary disease.      Electronically signed  by: Yung Yee  Date:    06/11/2024  Time:    14:44               Narrative:    EXAMINATION:  XR CHEST AP PORTABLE    CLINICAL HISTORY:  Dyspnea;    COMPARISON:  None available    TECHNIQUE:  XR CHEST AP PORTABLE    FINDINGS:  The heart and mediastinum are normal in size and configuration.  The pulmonary vascularity is normal in caliber.  No lung infiltrates, effusions, pneumothorax or other abnormality is demonstrated.                                    X-Rays:   Independently Interpreted Readings:   Other Readings:  EXAMINATION:  XR CHEST AP PORTABLE     CLINICAL HISTORY:  Dyspnea;     COMPARISON:  None available     TECHNIQUE:  XR CHEST AP PORTABLE     FINDINGS:  The heart and mediastinum are normal in size and configuration.  The pulmonary vascularity is normal in caliber.  No lung infiltrates, effusions, pneumothorax or other abnormality is demonstrated.     Impression:     No evidence of cardiopulmonary disease.        Electronically signed by:Yung Yee  Date:                                            06/11/2024  Time:                                           14:44      Medications   diltiaZEM injection 20 mg (20 mg Intravenous Given 6/11/24 1448)     Medical Decision Making  Amount and/or Complexity of Data Reviewed  Labs: ordered.  Radiology: ordered.    Risk  Prescription drug management.              Attending Attestation:           Physician Attestation for Scribe:  Physician Attestation Statement for Scribe #1: I, Chidi Malik MD, reviewed documentation, as scribed by Klaudia Hernandez in my presence, and it is both accurate and complete.             ED Course as of 06/11/24 1549   Tue Jun 11, 2024   1429 Medical decision-making:  Differential diagnosis includes tachycardia, AFib with RVR, SVT limited chest pain, STEMI, NSTEMI, electrolyte abnormality.  All testing ordered and interpreted by me. [BB]   1448 06/11/24 1446  X-Ray Chest AP Portable  Performed: 06/11/24 1441   Final         Impression: No evidence of cardiopulmonary disease. Electronically signed by: Yung Yee Date: 06/11/2024 Time: 14:44       [CM]   1545 Pro BNP is normal.  Troponin is normal.  Magnesium is normal.  CBC is normal.  CMP is normal. [BB]   1546 Chest x-ray by my interpretation shows no acute disease. [BB]   1549 On repeat exam pulse is now in the 90s after Cardizem.  Symptoms are resolved.  Patient is ready for discharge home. [BB]      ED Course User Index  [BB] Chidi Malik MD  [CM] Klaudia Hernandez                             Clinical Impression:  Final diagnoses:  [R06.02] Shortness of breath  [R00.0] Tachycardia (Primary)          ED Disposition Condition    Discharge Stable          ED Prescriptions    None       Follow-up Information    None          Chidi Malik MD  06/11/24 1798

## 2024-06-11 NOTE — Clinical Note
"Genoveva Lizama" Rio was seen and treated in our emergency department on 6/11/2024.  She may return to work on 06/12/2024.       If you have any questions or concerns, please don't hesitate to call.      Luz Sandhu RN    "

## 2024-06-11 NOTE — ED TRIAGE NOTES
Chief Complaint   Patient presents with    Shortness of Breath     Pt presents to ed with c/o SOB and elevated HR. Hx of 2 ablation and afib

## 2024-06-12 ENCOUNTER — TELEPHONE (OUTPATIENT)
Dept: EMERGENCY MEDICINE | Facility: HOSPITAL | Age: 34
End: 2024-06-12
Payer: COMMERCIAL

## 2024-08-27 ENCOUNTER — OFFICE VISIT (OUTPATIENT)
Dept: GASTROENTEROLOGY | Facility: CLINIC | Age: 34
End: 2024-08-27
Payer: COMMERCIAL

## 2024-08-27 ENCOUNTER — HOSPITAL ENCOUNTER (OUTPATIENT)
Dept: RADIOLOGY | Facility: HOSPITAL | Age: 34
Discharge: HOME | End: 2024-08-27
Payer: COMMERCIAL

## 2024-08-27 VITALS
WEIGHT: 204 LBS | RESPIRATION RATE: 18 BRPM | HEIGHT: 63 IN | BODY MASS INDEX: 36.14 KG/M2 | SYSTOLIC BLOOD PRESSURE: 107 MMHG | DIASTOLIC BLOOD PRESSURE: 79 MMHG | HEART RATE: 73 BPM

## 2024-08-27 DIAGNOSIS — R19.8 ALTERNATING CONSTIPATION AND DIARRHEA: ICD-10-CM

## 2024-08-27 DIAGNOSIS — R11.0 NAUSEA: Primary | ICD-10-CM

## 2024-08-27 DIAGNOSIS — R19.7 DIARRHEA, UNSPECIFIED TYPE: ICD-10-CM

## 2024-08-27 DIAGNOSIS — K58.1 IRRITABLE BOWEL SYNDROME WITH CONSTIPATION: ICD-10-CM

## 2024-08-27 PROCEDURE — 74018 RADEX ABDOMEN 1 VIEW: CPT | Mod: TC

## 2024-08-27 PROCEDURE — 99999 PR PBB SHADOW E&M-EST. PATIENT-LVL IV: CPT | Mod: PBBFAC,,,

## 2024-08-27 PROCEDURE — 74018 RADEX ABDOMEN 1 VIEW: CPT | Mod: 26,,, | Performed by: RADIOLOGY

## 2024-08-27 PROCEDURE — 99214 OFFICE O/P EST MOD 30 MIN: CPT | Mod: PBBFAC,25

## 2024-08-27 RX ORDER — ONDANSETRON 4 MG/1
4 TABLET, ORALLY DISINTEGRATING ORAL EVERY 6 HOURS PRN
Qty: 20 TABLET | Refills: 2 | Status: SHIPPED | OUTPATIENT
Start: 2024-08-27

## 2024-08-27 NOTE — PROGRESS NOTES
Gastroenterology Clinic Note    Patient ID: [id number]   Referring MD: Marlen Adam FNP   Chief Complaint:   Chief Complaint   Patient presents with    Constipation    Diarrhea    Nausea       History of Present Illness   Genoveva Pate is an 34 y.o. WF who is referred for diarrhea. Patient with frequent abdominal pain and cramping.  She has alternating constipation and diarrhea.  Over the past 1 week symptoms have worsened and she is now experiencing vomiting with diarrhea.  She also reports associated nausea.  She takes stool softeners when constipated.  Past surgical history includes cholecystectomy.  She had colonoscopy 10 years ago at [place].  Was told to polyps were removed but no other significant findings per patient.  She denies hematochezia or melena.    Last colonoscopy was >5 yrs ago at [place].    Review of Systems   Constitutional:  Negative for weight loss.   Gastrointestinal:  Positive for abdominal pain, constipation, diarrhea, heartburn, nausea and vomiting. Negative for blood in stool and melena.       Past Medical History      Past Medical History:   Diagnosis Date    Ching-Parkinson-White (WPW) syndrome        Past Surgical History     Past Surgical History:   Procedure Laterality Date    CARDIAC ELECTROPHYSIOLOGY MAPPING AND ABLATION      CHOLECYSTECTOMY      2013    TOTAL REDUCTION MAMMOPLASTY         Allergies   Review of patient's allergies indicates:  No Known Allergies    Immunization History     Immunization History   Administered Date(s) Administered    COVID-19, MRNA, LN-S, PF (MODERNA FULL 0.5 ML DOSE) 02/23/2022, 03/23/2022       Past Family History      Family History   Problem Relation Name Age of Onset    Breast cancer Neg Hx      Colon cancer Neg Hx      Ovarian cancer Neg Hx         Past Social History      Social History     Socioeconomic History    Marital status:    Tobacco Use    Smoking status: Never    Smokeless tobacco: Never   Substance and Sexual Activity  "   Alcohol use: Yes     Alcohol/week: 1.0 standard drink of alcohol     Types: 1 Glasses of wine per week    Drug use: Never    Sexual activity: Yes     Partners: Male     Birth control/protection: I.U.D.       Current Medications     Outpatient Medications Marked as Taking for the 8/27/24 encounter (Office Visit) with Marlen Adam FNP   Medication Sig Dispense Refill    ascorbic acid, vitamin C, (VITAMIN C) 1000 MG tablet Take 1,000 mg by mouth.      aspirin (ECOTRIN) 81 MG EC tablet Take 81 mg by mouth.      buPROPion (WELLBUTRIN XL) 300 MG 24 hr tablet Take 1 tablet (300 mg total) by mouth once daily. 90 tablet 3    flecainide (TAMBOCOR) 100 MG Tab Take 1 tablet (100 mg total) by mouth 2 (two) times a day. 180 tablet 3    flecainide (TAMBOCOR) 50 MG Tab Take 50 mg by mouth 2 (two) times daily.      metoprolol succinate (TOPROL-XL) 50 MG 24 hr tablet Take 50 mg by mouth.      metoprolol succinate (TOPROL-XL) 50 MG 24 hr tablet Take 1 tablet (50 mg total) by mouth once daily. 30 tablet 10        I have reviewed the current medications, allergies, vital signs, past medical and surgical history, family medical history, and social history for this encounter and agree with all findings.    OBJECTIVE    Physical Exam    /79 (BP Location: Left arm, Patient Position: Sitting)   Pulse 73   Resp 18   Ht 5' 3" (1.6 m)   Wt 92.5 kg (204 lb)   BMI 36.14 kg/m²   GEN: Well appearing, cooperative, NAD  NECK: Supple, no LAD  CV: Normal rate  RESP: Unlabored  ABD: ND, no guarding  EXT: No clubbing, cyanosis, or edema  SKIN: Warm and dry  NEURO: AAO x4.     LABS    CBC (with or without Differential):   Lab Results   Component Value Date    WBC 7.88 06/11/2024    HGB 14.6 06/11/2024    HCT 44.4 06/11/2024    MCV 98.9 (H) 06/11/2024    MCH 32.5 (H) 06/11/2024    MCHC 32.9 06/11/2024    RDW 11.9 06/11/2024     06/11/2024    MPV 11.1 06/11/2024    NEUTOPHILPCT 60.3 06/11/2024    DIFFTYPE Auto 06/11/2024     BMP/CMP: "   Lab Results   Component Value Date     06/11/2024    K 3.5 06/11/2024     06/11/2024    CO2 28 06/11/2024    BUN 8 06/11/2024    CREATININE 0.95 06/11/2024     06/11/2024    CALCIUM 9.3 06/11/2024    ALBUMIN 3.9 06/11/2024    AST 32 06/11/2024    ALT 80 (H) 06/11/2024    ALKPHOS 81 06/11/2024    MG 2.1 06/11/2024        IMAGING  No pertinent imaging available.    ASSESSMENT  Genoveva Pate is a 34 y.o. WF with history of WPW who is referred for diarrhea.     1. Nausea    2. Alternating constipation and diarrhea    3. Diarrhea, unspecified type    4. Irritable bowel syndrome with constipation           PLAN    - stool studies for diarrhea; rule out infectious etiology   - Xray KUB to assess stool burden   - Linzess 290 mcg daily for IBS-C  - RAJANI for records from [place] regarding colonoscopy   - Zofran for nausea PRN; suspect this will improve with treatment for IBS     There are no Patient Instructions on file for this visit.      Orders Placed This Encounter   Procedures    Giardia antigen     Standing Status:   Future     Standing Expiration Date:   10/26/2025    Fecal leukocytes     Standing Status:   Future     Standing Expiration Date:   10/26/2025    C Diff Toxin by PCR     Standing Status:   Future     Standing Expiration Date:   2/27/2025    Stool culture     Standing Status:   Future     Standing Expiration Date:   8/27/2025    X-Ray KUB     Standing Status:   Future     Number of Occurrences:   1     Standing Expiration Date:   8/27/2025     Order Specific Question:   May the Radiologist modify the order per protocol to meet the clinical needs of the patient?     Answer:   Yes     Order Specific Question:   Release to patient     Answer:   Immediate    Pancreatic elastase, fecal     Standing Status:   Future     Standing Expiration Date:   10/26/2025    Endomysial antibody, IgA titer     Standing Status:   Future     Standing Expiration Date:   10/27/2025    IgA     Standing  Status:   Future     Standing Expiration Date:   10/27/2025    Tissue Transglutaminase Ab, IgA     Standing Status:   Future     Standing Expiration Date:   11/25/2025    Calprotectin, Stool     Standing Status:   Future     Standing Expiration Date:   10/27/2025         The risks and benefits of my recommendations, as well as other treatment options were discussed with the patient today. All questions were answered.    35 minutes of total time spent on the encounter, which includes face to face time and non-face to face time preparing to see the patient (eg, review of tests), obtaining and/or reviewing separately obtained history, documenting clinical information in the electronic or other health record, Independently interpreting results (not separately reported) and communicating results to the patient/family/caregiver, or care coordination (not separately reported).        MUKESH MarcumP/ACNP  [facility]

## 2024-09-03 ENCOUNTER — TELEPHONE (OUTPATIENT)
Dept: GASTROENTEROLOGY | Facility: CLINIC | Age: 34
End: 2024-09-03
Payer: COMMERCIAL

## 2024-09-06 ENCOUNTER — PATIENT MESSAGE (OUTPATIENT)
Dept: INTERNAL MEDICINE | Facility: CLINIC | Age: 34
End: 2024-09-06

## 2024-09-06 ENCOUNTER — OFFICE VISIT (OUTPATIENT)
Dept: INTERNAL MEDICINE | Facility: CLINIC | Age: 34
End: 2024-09-06
Payer: COMMERCIAL

## 2024-09-06 DIAGNOSIS — E66.9 OBESITY, UNSPECIFIED CLASSIFICATION, UNSPECIFIED OBESITY TYPE, UNSPECIFIED WHETHER SERIOUS COMORBIDITY PRESENT: Primary | ICD-10-CM

## 2024-09-06 RX ORDER — SEMAGLUTIDE 0.25 MG/.5ML
0.25 INJECTION, SOLUTION SUBCUTANEOUS
Qty: 2 ML | Refills: 0 | Status: ACTIVE | OUTPATIENT
Start: 2024-09-06

## 2024-09-06 RX ORDER — SEMAGLUTIDE 1 MG/.5ML
1 INJECTION, SOLUTION SUBCUTANEOUS
Qty: 2 ML | Refills: 0 | Status: ACTIVE | OUTPATIENT
Start: 2024-11-01

## 2024-09-06 RX ORDER — SEMAGLUTIDE 0.5 MG/.5ML
0.5 INJECTION, SOLUTION SUBCUTANEOUS
Qty: 2 ML | Refills: 0 | Status: ACTIVE | OUTPATIENT
Start: 2024-10-04

## 2024-09-06 NOTE — PROGRESS NOTES
"Patient ID: Genoveva Pate is a 34 y.o. White female    Subjective  Chief Complaint: patient presents for medical weight loss management.    Contraindications to GLP-1 receptor agonist therapy:   Denies personal or family history of MTC, personal history of MEN2, history of allergic reaction while taking a GLP-1 receptor agonist, and history of pancreatitis while taking a GLP-1 receptor agonist     Pregnancy Status:   - Pt denies current pregnancy, breastfeeding, or plans to become pregnant.  - Pt denies current use of oral hormonal contraception.     History of weight loss therapy:  Reports has tried Adipex in the past, but couldn't tolerate. Took Trulicity as well and states did well on it and tolerated it well. D/c in 2019 due to change in insurance coverage. States would like to start Wegovy today.    Weight loss history:  Current weight:    8/27/2024   Vitals - 1 value per visit    Weight (lb) 204    Weight (kg) 92.534    Height 5' 3" (1.6 m)    BMI (Calculated) 36.1        Objective  Lab Results   Component Value Date     06/11/2024     09/06/2023     01/22/2021     Lab Results   Component Value Date    K 3.5 06/11/2024    K 4.2 09/06/2023    K 4.3 01/22/2021     Lab Results   Component Value Date     06/11/2024     (H) 09/06/2023     (H) 01/22/2021     Lab Results   Component Value Date    CO2 28 06/11/2024    CO2 27 09/06/2023    CO2 29 01/22/2021     Lab Results   Component Value Date    BUN 8 06/11/2024    BUN 11 09/06/2023    BUN 13 01/22/2021     Lab Results   Component Value Date     06/11/2024     (H) 09/06/2023    GLU 78 01/22/2021     Lab Results   Component Value Date    CALCIUM 9.3 06/11/2024    CALCIUM 8.9 09/06/2023    CALCIUM 8.8 01/22/2021     Lab Results   Component Value Date    PROT 7.9 06/11/2024    PROT 7.3 09/06/2023    PROT 7.7 01/22/2021     Lab Results   Component Value Date    ALBUMIN 3.9 06/11/2024    ALBUMIN 3.5 09/06/2023    " ALBUMIN 4.1 01/22/2021     Lab Results   Component Value Date    BILITOT 0.4 06/11/2024    BILITOT 0.4 09/06/2023    BILITOT 0.3 01/22/2021     Lab Results   Component Value Date    AST 32 06/11/2024    AST 17 09/06/2023    AST 20 01/22/2021     Lab Results   Component Value Date    ALT 80 (H) 06/11/2024    ALT 45 09/06/2023    ALT 25 01/22/2021     Lab Results   Component Value Date    ANIONGAP 12 06/11/2024    ANIONGAP 4 (L) 09/06/2023    ANIONGAP 7 01/22/2021     Lab Results   Component Value Date    CREATININE 0.95 06/11/2024    CREATININE 1.01 09/06/2023    CREATININE 0.910 01/22/2021     Lab Results   Component Value Date    EGFRNORACEVR 82 06/11/2024    EGFRNORACEVR 76 09/06/2023     Assessment/Plan  -Pt qualifies for GLP-1 RA therapy based on BMI greater than or equal to 30 kg/m2  -Initiate Wegovy 0.25 mg once weekly for 1 month  -Then increase to Wegovy 0.5 mg once weekly for 1 month  -Then increase to Wegovy 1 mg once weekly  -RTC in 3 months      Patient consented to pharmacist management via collaborative practice.

## 2024-09-06 NOTE — PATIENT INSTRUCTIONS
Wegovy Patient Education  Savings Card  Follow this link to sign up for your Wegovy savings card. You will need this information when the [facility] contacts you to help pay for your prescription.  Wegovy Savings Card    Dosing Schedule      Choosing an Injection Site and Using your Pen       - Pens are stored in the refrigerator and can be removed 20-30 minutes before the injection to allow the medication to come to room temperature to avoid irritation, burning, or bruising with injection.     What to Expect      Rare, but Serious Side Effects  - Wegovy may cause pancreatitis or gallstones. If you experience severe abdominal pain that may radiate to the back and may or may not be accompanied by vomiting, you are encouraged to seek medical attention to assess your symptoms.     Reproduction, Pregnancy, and Lactation Considerations  - Wegovy is not recommended for use in patients who are currently pregnant or breastfeeding.   - If you plan to become pregnant, the use of this medication is not recommended at the time of conception. It is recommended that this medication should be discontinued at least 2 months prior to conception.     Patient's using Oral Contraceptives  - Use of medications like Wegovy may decrease the effectiveness of your oral hormonal contraceptives.   - You are encouraged to add a barrier method of contraception for 4 weeks after initiation and for 4 weeks after each dose titration of Wegovy to minimize this potential risk.     Missed or Changing Your Dosing Schedule  - If you miss a dose of Wegovy, take it as soon as possible, within 5 days of your scheduled dose. If more than 5 days have passed, skip the missed dose and take your next dose on your regularly scheduled day.  - If you would like to change the day of the week you take your Wegovy dose, make sure there are at least 2 days between doses.

## 2024-09-09 ENCOUNTER — TELEPHONE (OUTPATIENT)
Dept: GASTROENTEROLOGY | Facility: CLINIC | Age: 34
End: 2024-09-09
Payer: COMMERCIAL

## 2024-09-09 NOTE — TELEPHONE ENCOUNTER
----- Message from BARBARA Marcum sent at 9/6/2024  1:08 PM CDT -----  Stool studies are normal with a normal fecal lucio

## 2024-10-17 NOTE — TELEPHONE ENCOUNTER
----- Message from BARBARA Marcum sent at 8/30/2024  1:54 PM CDT -----  Stool burden consistent with constipation.  Continue with treatment plan discussed in clinic   20

## 2024-10-25 RX ORDER — BUPROPION HYDROCHLORIDE 300 MG/1
300 TABLET ORAL DAILY
Qty: 90 TABLET | Refills: 3 | Status: SHIPPED | OUTPATIENT
Start: 2024-10-25

## 2024-11-11 DIAGNOSIS — L68.0 HIRSUTISM: ICD-10-CM

## 2024-11-11 DIAGNOSIS — Z13.1 SCREENING FOR DIABETES MELLITUS (DM): ICD-10-CM

## 2024-11-11 DIAGNOSIS — E55.9 VITAMIN D DEFICIENCY: ICD-10-CM

## 2024-11-11 DIAGNOSIS — E53.8 VITAMIN B12 DEFICIENCY: ICD-10-CM

## 2024-11-11 DIAGNOSIS — R53.83 FATIGUE, UNSPECIFIED TYPE: ICD-10-CM

## 2024-11-11 DIAGNOSIS — Z13.220 ENCOUNTER FOR SCREENING FOR LIPOID DISORDERS: Primary | ICD-10-CM

## 2024-11-29 DIAGNOSIS — E66.9 OBESITY, UNSPECIFIED CLASSIFICATION, UNSPECIFIED OBESITY TYPE, UNSPECIFIED WHETHER SERIOUS COMORBIDITY PRESENT: ICD-10-CM

## 2024-12-02 RX ORDER — SEMAGLUTIDE 1 MG/.5ML
1 INJECTION, SOLUTION SUBCUTANEOUS
Qty: 2 ML | Refills: 0 | Status: ACTIVE | OUTPATIENT
Start: 2024-12-02

## 2024-12-19 RX ORDER — DOXYCYCLINE 100 MG/1
100 CAPSULE ORAL 2 TIMES DAILY
Qty: 14 CAPSULE | Refills: 0 | Status: SHIPPED | OUTPATIENT
Start: 2024-12-19 | End: 2024-12-26

## 2024-12-31 DIAGNOSIS — E66.9 OBESITY, UNSPECIFIED CLASSIFICATION, UNSPECIFIED OBESITY TYPE, UNSPECIFIED WHETHER SERIOUS COMORBIDITY PRESENT: ICD-10-CM

## 2025-01-02 RX ORDER — SEMAGLUTIDE 1 MG/.5ML
1 INJECTION, SOLUTION SUBCUTANEOUS
Qty: 2 ML | Refills: 2 | Status: ACTIVE | OUTPATIENT
Start: 2025-01-02

## 2025-01-27 ENCOUNTER — PATIENT MESSAGE (OUTPATIENT)
Dept: ADMINISTRATIVE | Facility: OTHER | Age: 35
End: 2025-01-27
Payer: COMMERCIAL

## 2025-02-25 ENCOUNTER — OFFICE VISIT (OUTPATIENT)
Dept: INTERNAL MEDICINE | Facility: CLINIC | Age: 35
End: 2025-02-25
Payer: COMMERCIAL

## 2025-02-25 ENCOUNTER — PATIENT MESSAGE (OUTPATIENT)
Dept: INTERNAL MEDICINE | Facility: CLINIC | Age: 35
End: 2025-02-25

## 2025-02-25 ENCOUNTER — PATIENT MESSAGE (OUTPATIENT)
Dept: ADMINISTRATIVE | Facility: OTHER | Age: 35
End: 2025-02-25
Payer: COMMERCIAL

## 2025-02-25 DIAGNOSIS — E66.811 OBESITY, CLASS I, BMI 30.0-34.9 (SEE ACTUAL BMI): Primary | ICD-10-CM

## 2025-02-25 PROCEDURE — 99499 UNLISTED E&M SERVICE: CPT | Mod: 95,,,

## 2025-02-25 RX ORDER — SEMAGLUTIDE 1.7 MG/.75ML
1.7 INJECTION, SOLUTION SUBCUTANEOUS
Qty: 3 ML | Refills: 0 | Status: ACTIVE | OUTPATIENT
Start: 2025-02-25

## 2025-02-25 RX ORDER — SEMAGLUTIDE 2.4 MG/.75ML
2.4 INJECTION, SOLUTION SUBCUTANEOUS
Qty: 3 ML | Refills: 2 | Status: ACTIVE | OUTPATIENT
Start: 2025-02-25

## 2025-02-25 NOTE — PROGRESS NOTES
Patient ID: Genoveva Pate is a 34 y.o. White female    Subjective  Chief Complaint: patient presents for medical weight loss management.    Co-morbidities: none    HPI: Patient [facility] 2024 and is currently managed on Wegovy 1 mg.     Tolerance to current therapy:  Denies nausea, vomiting, diarrhea, constipation, abdominal pain    Weight loss history:  Starting weight:    8/27/2024   Vitals - 1 value per visit    Weight (lb) 204    BMI (Calculated) 36.1    Current weight:    2/13/2025   Recent Readings    Weight (lbs) 183 lb    BMI 32.41 BMI    % weight loss since GLP-1 initiation: 10.3 %    Objective  Lab Results   Component Value Date     11/12/2024     06/11/2024     09/06/2023     Lab Results   Component Value Date    K 4.0 11/12/2024    K 3.5 06/11/2024    K 4.2 09/06/2023     Lab Results   Component Value Date     (H) 11/12/2024     06/11/2024     (H) 09/06/2023     Lab Results   Component Value Date    CO2 26 11/12/2024    CO2 28 06/11/2024    CO2 27 09/06/2023     Lab Results   Component Value Date    BUN 9 11/12/2024    BUN 8 06/11/2024    BUN 11 09/06/2023     Lab Results   Component Value Date    GLU 80 11/12/2024     06/11/2024     (H) 09/06/2023     Lab Results   Component Value Date    CALCIUM 8.6 11/12/2024    CALCIUM 9.3 06/11/2024    CALCIUM 8.9 09/06/2023     Lab Results   Component Value Date    PROT 7.9 11/12/2024    PROT 7.9 06/11/2024    PROT 7.3 09/06/2023     Lab Results   Component Value Date    ALBUMIN 3.8 11/12/2024    ALBUMIN 3.9 06/11/2024    ALBUMIN 3.5 09/06/2023     Lab Results   Component Value Date    BILITOT 0.2 11/12/2024    BILITOT 0.4 06/11/2024    BILITOT 0.4 09/06/2023     Lab Results   Component Value Date    AST 24 11/12/2024    AST 32 06/11/2024    AST 17 09/06/2023     Lab Results   Component Value Date    ALT 40 11/12/2024    ALT 80 (H) 06/11/2024    ALT 45 09/06/2023      Lab Results   Component Value Date    ANIONGAP 9 11/12/2024    ANIONGAP 12 06/11/2024    ANIONGAP 4 (L) 09/06/2023     Lab Results   Component Value Date    CREATININE 1.07 (H) 11/12/2024    CREATININE 0.95 06/11/2024    CREATININE 1.01 09/06/2023     Lab Results   Component Value Date    EGFRNORACEVR 70 11/12/2024    EGFRNORACEVR 82 06/11/2024    EGFRNORACEVR 76 09/06/2023     Assessment/Plan  - Increase to Wegovy 1.7 mg x 4 weeks once completed with Wegovy 1 mg  - Then increase to Wegovy 2.4 mg SQ weekly  - RTC in 3 months for follow-up evaluation    Patient consented to pharmacist management via collaborative practice.

## 2025-03-04 DIAGNOSIS — Z30.430 ENCOUNTER FOR INSERTION OF MIRENA IUD: Primary | ICD-10-CM

## 2025-03-06 ENCOUNTER — PROCEDURE VISIT (OUTPATIENT)
Dept: OBSTETRICS AND GYNECOLOGY | Facility: CLINIC | Age: 35
End: 2025-03-06
Payer: COMMERCIAL

## 2025-03-06 VITALS
HEIGHT: 63 IN | BODY MASS INDEX: 32.6 KG/M2 | DIASTOLIC BLOOD PRESSURE: 72 MMHG | SYSTOLIC BLOOD PRESSURE: 112 MMHG | WEIGHT: 184 LBS | OXYGEN SATURATION: 100 % | HEART RATE: 74 BPM | RESPIRATION RATE: 19 BRPM

## 2025-03-06 DIAGNOSIS — Z30.430 ENCOUNTER FOR INSERTION OF MIRENA IUD: Primary | ICD-10-CM

## 2025-03-06 DIAGNOSIS — Z12.4 SCREENING FOR CERVICAL CANCER: ICD-10-CM

## 2025-03-06 PROCEDURE — 88141 CYTOPATH C/V INTERPRET: CPT | Mod: ,,, | Performed by: PATHOLOGY

## 2025-03-06 PROCEDURE — 87626 HPV SEP HI-RSK TYP&POOL RSLT: CPT | Mod: ,,, | Performed by: CLINICAL MEDICAL LABORATORY

## 2025-03-06 PROCEDURE — 88142 CYTOPATH C/V THIN LAYER: CPT | Mod: TC,GCY | Performed by: ADVANCED PRACTICE MIDWIFE

## 2025-03-10 ENCOUNTER — CLINICAL SUPPORT (OUTPATIENT)
Dept: OBSTETRICS AND GYNECOLOGY | Facility: CLINIC | Age: 35
End: 2025-03-10
Payer: COMMERCIAL

## 2025-03-10 DIAGNOSIS — J06.9 UPPER RESPIRATORY TRACT INFECTION, UNSPECIFIED TYPE: Primary | ICD-10-CM

## 2025-03-10 PROCEDURE — 99213 OFFICE O/P EST LOW 20 MIN: CPT | Mod: PBBFAC

## 2025-03-10 PROCEDURE — 96372 THER/PROPH/DIAG INJ SC/IM: CPT | Mod: PBBFAC | Performed by: OBSTETRICS & GYNECOLOGY

## 2025-03-10 PROCEDURE — 99999PBSHW PR PBB SHADOW TECHNICAL ONLY FILED TO HB: Mod: PBBFAC,,,

## 2025-03-10 PROCEDURE — 99999 PR PBB SHADOW E&M-EST. PATIENT-LVL III: CPT | Mod: PBBFAC,,,

## 2025-03-10 RX ORDER — DEXAMETHASONE SODIUM PHOSPHATE 4 MG/ML
8 INJECTION, SOLUTION INTRA-ARTICULAR; INTRALESIONAL; INTRAMUSCULAR; INTRAVENOUS; SOFT TISSUE
Status: COMPLETED | OUTPATIENT
Start: 2025-03-10 | End: 2025-03-10

## 2025-03-10 RX ORDER — CEFTRIAXONE 1 G/1
1 INJECTION, POWDER, FOR SOLUTION INTRAMUSCULAR; INTRAVENOUS
Status: COMPLETED | OUTPATIENT
Start: 2025-03-10 | End: 2025-03-10

## 2025-03-10 RX ADMIN — DEXAMETHASONE SODIUM PHOSPHATE 8 MG: 4 INJECTION INTRA-ARTICULAR; INTRALESIONAL; INTRAMUSCULAR; INTRAVENOUS; SOFT TISSUE at 10:03

## 2025-03-10 RX ADMIN — CEFTRIAXONE SODIUM 1 G: 1 INJECTION, POWDER, FOR SOLUTION INTRAMUSCULAR; INTRAVENOUS at 10:03

## 2025-03-10 NOTE — PROGRESS NOTES
Pt tolerated decadron injection to left ventrogluteal well. Pt also tolerated rocephin injection to right ventrogluteal well.

## 2025-03-13 LAB
GH SERPL-MCNC: ABNORMAL NG/ML
INSULIN SERPL-ACNC: ABNORMAL U[IU]/ML
LAB AP CLINICAL INFORMATION: ABNORMAL
LAB AP GYN INTERPRETATION: ABNORMAL
LAB AP PAP DISCLAIMER COMMENTS: ABNORMAL
RENIN PLAS-CCNC: ABNORMAL NG/ML/H

## 2025-03-14 LAB
HPV 16: NEGATIVE
HPV 18: NEGATIVE
HPV OTHER: POSITIVE

## 2025-03-17 ENCOUNTER — RESULTS FOLLOW-UP (OUTPATIENT)
Dept: OBSTETRICS AND GYNECOLOGY | Facility: CLINIC | Age: 35
End: 2025-03-17

## 2025-03-17 DIAGNOSIS — R87.612 LGSIL ON PAP SMEAR OF CERVIX: Primary | ICD-10-CM

## 2025-03-17 DIAGNOSIS — R87.810 CERVICAL HIGH RISK HPV (HUMAN PAPILLOMAVIRUS) TEST POSITIVE: ICD-10-CM

## 2025-03-17 RX ORDER — ALPRAZOLAM 1 MG/1
1 TABLET ORAL ONCE
Qty: 1 TABLET | Refills: 0 | Status: SHIPPED | OUTPATIENT
Start: 2025-03-17 | End: 2025-03-19

## 2025-03-27 ENCOUNTER — PROCEDURE VISIT (OUTPATIENT)
Dept: OBSTETRICS AND GYNECOLOGY | Facility: CLINIC | Age: 35
End: 2025-03-27
Payer: COMMERCIAL

## 2025-03-27 VITALS
SYSTOLIC BLOOD PRESSURE: 122 MMHG | DIASTOLIC BLOOD PRESSURE: 63 MMHG | BODY MASS INDEX: 31.53 KG/M2 | HEART RATE: 94 BPM | WEIGHT: 178 LBS

## 2025-03-27 DIAGNOSIS — Z01.812 PRE-PROCEDURE LAB EXAM: Primary | ICD-10-CM

## 2025-03-27 DIAGNOSIS — R87.810 CERVICAL HIGH RISK HPV (HUMAN PAPILLOMAVIRUS) TEST POSITIVE: ICD-10-CM

## 2025-03-27 DIAGNOSIS — R87.612 LGSIL ON PAP SMEAR OF CERVIX: ICD-10-CM

## 2025-03-27 PROCEDURE — 99499 UNLISTED E&M SERVICE: CPT | Mod: S$PBB,,, | Performed by: STUDENT IN AN ORGANIZED HEALTH CARE EDUCATION/TRAINING PROGRAM

## 2025-03-27 PROCEDURE — 57454 BX/CURETT OF CERVIX W/SCOPE: CPT | Mod: PBBFAC | Performed by: STUDENT IN AN ORGANIZED HEALTH CARE EDUCATION/TRAINING PROGRAM

## 2025-03-27 NOTE — PROCEDURES
Colposcopy-Future    Date/Time: 3/27/2025 4:00 PM    Performed by: Alberto Martinez DO  Authorized by: Alberto Martinez DO    Consent obatined:  Prior to procedure the appropriate consent was completed and verified  Timeout:Immediately prior to procedure a time out was called to verify the correct patient, procedure, equipment, support staff and site/side marked as required  Prep:Patient was prepped and draped in the usual sterile fashion  Assistants?: Yes     I was present for the entire procedure.    Colposcopy Site:  Cervix  Position:  Supine   Patient was prepped and draped in the normal sterile fashion.  Acrowhite Lesion: No    Atypical Vessels: No    Transformation Zone Adequate?: Yes    Biopsy?: Yes         Location:  Cervix ((11 00))  ECC Performed?: Yes    LEEP Performed?: No    Estimated blood loss (cc):  5   Patient tolerated the procedure well with no immediate complications.   Post-operative instructions were provided for the patient.   Patient was discharged and will follow up if any complications occur

## 2025-04-11 ENCOUNTER — OFFICE VISIT (OUTPATIENT)
Dept: DERMATOLOGY | Facility: CLINIC | Age: 35
End: 2025-04-11
Payer: COMMERCIAL

## 2025-04-11 DIAGNOSIS — L72.11 PILAR CYST: Primary | ICD-10-CM

## 2025-04-11 NOTE — PROGRESS NOTES
[facility]  Ryan Schroeder MD    [address] [place] [id number]  [phone]    Fax: [phone]    Patient Name: Genoveva Pate  Medical Record Number: [id number]  PCP: Myrna, Primary Doctor  Age: 34 y.o. : 1990  Contact: [phone] (home)     CC: lesions on scalp  History of Present Illness:     Genoveva Pate is a 34 y.o.  female with no history of skin cancer  who presents to clinic today for lesions on the scalp. It has been present 2 years. Symptoms include irritation.     The patient has no other concerns today.    Review of Systems:     Unremarkable other than mentioned above.     Physical Exam:     General: Relaxed, oriented, alert    Skin examination of the scalp, face, neck, chest, back, abdomen, upper extremities and lower extremities were normal except for as listed below      Assessment and Plan:     1. Pilar Cyst  - firm subcutaneous cyst(s) located on the scalp.  - Patient was educated on the benign nature of these.   - can schedule excision if patient desires     Ryan Schroeder MD   Mohs Surgery/Dermatologic Oncology  Dermatology

## 2025-04-17 ENCOUNTER — PATIENT MESSAGE (OUTPATIENT)
Dept: DERMATOLOGY | Facility: CLINIC | Age: 35
End: 2025-04-17
Payer: COMMERCIAL

## 2025-05-13 ENCOUNTER — OFFICE VISIT (OUTPATIENT)
Dept: INTERNAL MEDICINE | Facility: CLINIC | Age: 35
End: 2025-05-13
Payer: COMMERCIAL

## 2025-05-13 ENCOUNTER — PATIENT MESSAGE (OUTPATIENT)
Dept: INTERNAL MEDICINE | Facility: CLINIC | Age: 35
End: 2025-05-13

## 2025-05-13 DIAGNOSIS — E66.3 OVERWEIGHT WITH BODY MASS INDEX (BMI) OF 29 TO 29.9 IN ADULT: Primary | ICD-10-CM

## 2025-05-13 PROCEDURE — 99499 UNLISTED E&M SERVICE: CPT | Mod: 95,,,

## 2025-05-13 RX ORDER — SEMAGLUTIDE 2.4 MG/.75ML
2.4 INJECTION, SOLUTION SUBCUTANEOUS
Qty: 3 ML | Refills: 5 | Status: ACTIVE | OUTPATIENT
Start: 2025-05-13

## 2025-05-13 NOTE — PROGRESS NOTES
Patient ID: Genoveva Pate is a 34 y.o. White female    Subjective  Chief Complaint: patient presents for medical weight loss management.    Co-morbidities: none    HPI: Patient [facility] September 2024 and is currently managed on Wegovy 2.4 mg.     Tolerance to current therapy:  Denies nausea, vomiting, diarrhea, constipation, abdominal pain    Weight loss history:  Starting weight:    8/27/2024   Vitals - 1 value per visit    Weight (lb) 204    BMI (Calculated) 36.1    Current weight:    5/12/2025   Recent Readings    Weight (lbs) 168 lb    BMI 29.76 BMI    % weight loss since GLP-1 initiation: 17.6 %    Objective  Lab Results   Component Value Date     11/12/2024     06/11/2024     09/06/2023     Lab Results   Component Value Date    K 4.0 11/12/2024    K 3.5 06/11/2024    K 4.2 09/06/2023     Lab Results   Component Value Date     (H) 11/12/2024     06/11/2024     (H) 09/06/2023     Lab Results   Component Value Date    CO2 26 11/12/2024    CO2 28 06/11/2024    CO2 27 09/06/2023     Lab Results   Component Value Date    BUN 9 11/12/2024    BUN 8 06/11/2024    BUN 11 09/06/2023     Lab Results   Component Value Date    GLU 80 11/12/2024     06/11/2024     (H) 09/06/2023     Lab Results   Component Value Date    CALCIUM 8.6 11/12/2024    CALCIUM 9.3 06/11/2024    CALCIUM 8.9 09/06/2023     Lab Results   Component Value Date    PROT 7.9 11/12/2024    PROT 7.9 06/11/2024    PROT 7.3 09/06/2023     Lab Results   Component Value Date    ALBUMIN 3.8 11/12/2024    ALBUMIN 3.9 06/11/2024    ALBUMIN 3.5 09/06/2023     Lab Results   Component Value Date    BILITOT 0.2 11/12/2024    BILITOT 0.4 06/11/2024    BILITOT 0.4 09/06/2023     Lab Results   Component Value Date    AST 24 11/12/2024    AST 32 06/11/2024    AST 17 09/06/2023     Lab Results   Component Value Date    ALT 40 11/12/2024    ALT 80 (H) 06/11/2024    ALT 45 09/06/2023      Lab Results   Component Value Date    ANIONGAP 9 11/12/2024    ANIONGAP 12 06/11/2024    ANIONGAP 4 (L) 09/06/2023     Lab Results   Component Value Date    CREATININE 1.07 (H) 11/12/2024    CREATININE 0.95 06/11/2024    CREATININE 1.01 09/06/2023     Lab Results   Component Value Date    EGFRNORACEVR 70 11/12/2024    EGFRNORACEVR 82 06/11/2024    EGFRNORACEVR 76 09/06/2023     Assessment/Plan  - Continue Wegovy 2.4 mg SQ weekly  - RTC in 6 months for follow-up evaluation    Patient consented to pharmacist management via collaborative practice.

## 2025-05-20 NOTE — PATIENT INSTRUCTIONS
WOUND CARE INSTRUCTIONS    1. Leave your pressure bandage on for 24 hours (unless told to keep it on for 48 hours). You will not need to perform any wound care until this bandage is removed. Please do not get the bandage wet.  2. When you initially begin wound care, you may let the water hit the pressure bandage to loosen it from your skin. The bandage should be removed before bathing/showering.  3. Wash your hands thoroughly before starting wound care. Do not use the same cloth/rag/sponge you would use to wash the remainder of your body as this may introduce bacteria from other areas of your body and possibly cause infection at the surgical site.  4. Please clean the surgery site once to twice daily with a mild liquid soap (i.e. Dove, Cetaphil, Baby shampoo).   5. Dry the area with a fresh Q-tip or clean gauze.  6. Perform Vinegar soak to the area to help prevent infection. Soak the affected area for 5-10 minutes once daily, then pat dry. To make a quart of the vinegar soak, mix 3 tablespoons white vinegar with 1 quart of luke-warm water.   7. Apply a generous amount of Vaseline or Aquaphor to the wound/sutures (If you have been prescribed antibiotic ointment such as Mupirocin or Gentamicin, use this instead of vaseline/aquaphor). If you are not sure of the sanitary condition of any Vaseline/Aquaphor you may have at home, please purchase a new jar or tube.    8. Cut a non-stick bandage pad to fit the area and then use bandaging tape to hold in place. Paper tape is a good option for very sensitive skin types.  9. You will be doing this wound care regimen until sutures are removed   10.  After surgery, you may restart all your medications that were stopped (if applicable).      If your surgical site is on your forehead, or close to the eye area, you will want to use ice packs. Please apply ice packs every hour for 20 minutes while awake. Sleep elevated for the next two nights as this will help decrease the amount of  bruising and swelling you will notice the evening after surgery and into the next morning.   For surgical areas on your arms/legs, try to keep the area elevated above the level of your heart as much as possible. This will help to decrease swelling. Frequent gentle rubbing of your fingers or toes in that area will prevent numbness and stiffness.   If located on your arm/hand, we ask that you do not lift anything heavier than a gallon of milk for two weeks. Keep the arm/hand elevated to help decrease swelling in the wrist and fingers. Do not wear jewelry as impending swelling could cause discomfort.  For surgical areas on your head/neck, do not bend over or stoop down. Do not drop your head, as this increases blood to the surgical area and can induce bleeding. Refrain from use of hair care products, hair coloring, or permanents until sutures have been removed and/or the surgical site has completely healed.    BATHING: Begin bathing/showering once pressure bandage comes off. Do not let direct water pressure hit the surgery site. It is okay if it gets wet, just let the water roll over.    PAIN: Tylenol (Acetaminophen) or NSAIDs such as ibuprofen (Advil) or naproxen (Aleve) are adequate for pain relief in most cases, if you are able to take those medications. Alternating Tylenol (acetaminophen) and NSAIDs at 3 hour intervals works well as these medications work differently. For instance, take 1 g of Tylenol at hour 0, then 200-400 mg of Advil at hour 3 if needed, then 1 g of Tylenol at hour 6 if needed, then 200-400 mg of Advil at hour 9 if needed. Do not exceed the daily limit for either medication, which can be found on the bottle. We try to avoid narcotic medications as much as possible. If you are still having severe pain not managed by the above methods, please call our clinic.    SIGNS OF POSSIBLE INFECTION: Significant redness surrounding the surgery site that is warm to the touch, persistent or worsening pain,  fever or flu-like symptoms, increased swelling to the area, thick yellow discharge, and/or foul odor. Please call our office as soon as possible if you experience any of these symptoms as you may have an infection.     BLEEDING: A mild amount of blood on the bandage is expected. Soaking through the bandage is not normal. If this occurs, remove the soiled bandage and apply uninterrupted pressure for 20 minutes by the clock. If this does not stop the bleeding, hold pressure for another 20 minutes with an ice pack. If bleeding stops, apply a bandage per wound care instructions.     IF THE BLEEDING PERSISTS, PLEASE CALL OUR OFFICE.     Normal office hours:   After hours:     If you have concerns about how your wound is healing and would like to send us a photo, please send us a [employer] message, or call for instructions on how to securely send an email.

## 2025-05-20 NOTE — PROGRESS NOTES
Excision Consult Note    Genoveva Pate is a 34 y.o. female who is referred by Dr. Schroeder for evaluation of a NUB x2 on the right scalp and mid frontal scalp. They have grown and become tender.     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    Pathologic Findings:  Accession # n/[id number]  Diagnosis: NUB    Assessment and Plan:  Treatment Options : Given the indications and high cure rate, the patient has agreed to proceed with excision  Risks and Benefits : The rationale for excision was explained to the patient. The risks and benefits to therapy were discussed in detail. Specifically, the risks of infection, scarring, bleeding, dehiscence, hematoma, prolonged wound healing, incomplete removal, allergy to anesthesia, nerve injury, inability to clear the lesion and recurrence were addressed. The treatment site was clearly identified and confirmed by the patient.    Plan:  Excision    Ryan Schroeder MD   Mohs Surgery/Dermatologic Oncology

## 2025-05-21 ENCOUNTER — CLINICAL SUPPORT (OUTPATIENT)
Dept: OTHER | Facility: CLINIC | Age: 35
End: 2025-05-21

## 2025-05-21 DIAGNOSIS — Z00.8 ENCOUNTER FOR OTHER GENERAL EXAMINATION: ICD-10-CM

## 2025-05-22 ENCOUNTER — PROCEDURE VISIT (OUTPATIENT)
Dept: DERMATOLOGY | Facility: CLINIC | Age: 35
End: 2025-05-22
Payer: COMMERCIAL

## 2025-05-22 VITALS
WEIGHT: 169 LBS | BODY MASS INDEX: 28.85 KG/M2 | SYSTOLIC BLOOD PRESSURE: 112 MMHG | DIASTOLIC BLOOD PRESSURE: 82 MMHG | HEIGHT: 64 IN

## 2025-05-22 VITALS — SYSTOLIC BLOOD PRESSURE: 112 MMHG | DIASTOLIC BLOOD PRESSURE: 81 MMHG

## 2025-05-22 DIAGNOSIS — D48.5 NEOPLASM OF UNCERTAIN BEHAVIOR OF SKIN: Primary | ICD-10-CM

## 2025-05-22 LAB
HDLC SERPL-MCNC: 40 MG/DL
POC CHOLESTEROL, LDL (DOCK): 73 MG/DL
POC CHOLESTEROL, TOTAL: 129 MG/DL
POC GLUCOSE, FASTING: 86 MG/DL (ref 60–110)
TRIGL SERPL-MCNC: 79 MG/DL

## 2025-05-22 PROCEDURE — 88304 TISSUE EXAM BY PATHOLOGIST: CPT | Mod: TC,SUR | Performed by: STUDENT IN AN ORGANIZED HEALTH CARE EDUCATION/TRAINING PROGRAM

## 2025-05-22 PROCEDURE — 88304 TISSUE EXAM BY PATHOLOGIST: CPT | Mod: 26,,, | Performed by: PATHOLOGY

## 2025-05-22 RX ORDER — MUPIROCIN 20 MG/G
OINTMENT TOPICAL 2 TIMES DAILY
Qty: 22 G | Refills: 5 | Status: SHIPPED | OUTPATIENT
Start: 2025-05-22

## 2025-05-22 NOTE — PROGRESS NOTES
[facility]    Ryan Schroeder MD    Elliptical Excision with Linear Closure    Tumor Type: NUB x2  Location:  (A) right frontal scalp (B) mid frontal scalp   Derm-Path Accession #:  [id number]  Lesion Size:  (A) 1.5 x 1.5 cm (B) 1.1 x 1.0 cm   Surgical Margins: 0  Post op size: same as above  Level of Defect:  Fat  Repair Type:  Linear   Repair Length:  (A) 1.5 cm (B) 1.1 cm   Sutures: 4-0 Monocryl; 5-0 Prolene   Amount of lidocaine used: 6 mL; 3mL of Clinda   Primary Surgeon: FAIZA Schroeder MD      INDICATIONS:  The risks of bleeding, infection, discomfort, incomplete removal, and scar formation were explained to the patient.  All questions were answered.  After informed consent, confirmation of site and identity, and appropriate instructions, the patient underwent the procedure as follows:    PROCEDURE:  With the patient in a supine position, the lesion was outlined with the above margins. An ellipse was designed around the lesion to conform to relaxed skin tension lines in an effort to minimize scarring and deformity.  The patient was then placed in a supine position.  The lesion and surrounding skin were prepped with chlorhexidine, draped, and anesthetized with 1% lidocaine with epinephrine 1:100,100 buffered with 1:10 sodium bicarbonate.  Using a #15 blade, the skin was excised along premarked lines.  The resulting defect extended through deep subcutaneous tissue.  Wound margins were undermined to limit functional deformity/impairment of adjacent structures.  Bleeding vessels were controlled with  monopolar  electrodessication .  A deep placating retention suture was placed to offload tension to the deep margin. The dermis and subcutaneous tissue were closed with buried vertical mattress sutures.  Epidermal approximation was meticulously refined with simple running sutures, resulting in a linear closure with little to no wound tension.  Blood loss was estimated to be less than 5cc.  The area was coated with  petrolatum and covered with a non-adherent dressing followed by gauze and tape.  Postoperative instructions were reviewed per protocol.  The patient left alert and fully oriented.        Ryan Schroeder MD

## 2025-05-27 ENCOUNTER — RESULTS FOLLOW-UP (OUTPATIENT)
Dept: DERMATOLOGY | Facility: CLINIC | Age: 35
End: 2025-05-27

## 2025-06-24 ENCOUNTER — PATIENT MESSAGE (OUTPATIENT)
Dept: ADMINISTRATIVE | Facility: OTHER | Age: 35
End: 2025-06-24
Payer: COMMERCIAL

## 2025-06-25 RX ORDER — BUSPIRONE HYDROCHLORIDE 5 MG/1
5 TABLET ORAL 2 TIMES DAILY
Qty: 180 TABLET | Refills: 3 | Status: SHIPPED | OUTPATIENT
Start: 2025-06-25 | End: 2026-06-25

## 2025-07-02 ENCOUNTER — CLINICAL SUPPORT (OUTPATIENT)
Dept: OBSTETRICS AND GYNECOLOGY | Facility: CLINIC | Age: 35
End: 2025-07-02
Payer: COMMERCIAL

## 2025-07-02 DIAGNOSIS — J32.9 SINUSITIS, UNSPECIFIED CHRONICITY, UNSPECIFIED LOCATION: Primary | ICD-10-CM

## 2025-07-02 PROCEDURE — 99213 OFFICE O/P EST LOW 20 MIN: CPT | Mod: PBBFAC

## 2025-07-02 PROCEDURE — 96372 THER/PROPH/DIAG INJ SC/IM: CPT | Mod: PBBFAC | Performed by: ADVANCED PRACTICE MIDWIFE

## 2025-07-02 PROCEDURE — 99999 PR PBB SHADOW E&M-EST. PATIENT-LVL III: CPT | Mod: PBBFAC,,,

## 2025-07-02 PROCEDURE — 99999PBSHW PR PBB SHADOW TECHNICAL ONLY FILED TO HB: Mod: PBBFAC,,,

## 2025-07-02 RX ORDER — DEXAMETHASONE SODIUM PHOSPHATE 4 MG/ML
4 INJECTION, SOLUTION INTRA-ARTICULAR; INTRALESIONAL; INTRAMUSCULAR; INTRAVENOUS; SOFT TISSUE
Status: COMPLETED | OUTPATIENT
Start: 2025-07-02 | End: 2025-07-02

## 2025-07-02 RX ADMIN — DEXAMETHASONE SODIUM PHOSPHATE 4 MG: 4 INJECTION, SOLUTION INTRA-ARTICULAR; INTRALESIONAL; INTRAMUSCULAR; INTRAVENOUS; SOFT TISSUE at 03:07

## 2025-07-28 ENCOUNTER — PATIENT MESSAGE (OUTPATIENT)
Dept: ADMINISTRATIVE | Facility: OTHER | Age: 35
End: 2025-07-28
Payer: COMMERCIAL

## 2025-08-11 ENCOUNTER — CLINICAL SUPPORT (OUTPATIENT)
Dept: OBSTETRICS AND GYNECOLOGY | Facility: CLINIC | Age: 35
End: 2025-08-11
Payer: COMMERCIAL

## 2025-08-11 VITALS — OXYGEN SATURATION: 99 % | HEIGHT: 64 IN | WEIGHT: 168 LBS | RESPIRATION RATE: 19 BRPM | BODY MASS INDEX: 28.68 KG/M2

## 2025-08-11 DIAGNOSIS — R30.0 DYSURIA: Primary | ICD-10-CM

## 2025-08-11 PROCEDURE — 87077 CULTURE AEROBIC IDENTIFY: CPT | Mod: ,,, | Performed by: CLINICAL MEDICAL LABORATORY

## 2025-08-11 PROCEDURE — 99213 OFFICE O/P EST LOW 20 MIN: CPT | Mod: PBBFAC

## 2025-08-11 PROCEDURE — 87086 URINE CULTURE/COLONY COUNT: CPT | Mod: ,,, | Performed by: CLINICAL MEDICAL LABORATORY

## 2025-08-11 PROCEDURE — 87186 SC STD MICRODIL/AGAR DIL: CPT | Mod: ,,, | Performed by: CLINICAL MEDICAL LABORATORY

## 2025-08-11 PROCEDURE — 99999 PR PBB SHADOW E&M-EST. PATIENT-LVL III: CPT | Mod: PBBFAC,,,

## 2025-08-12 ENCOUNTER — CLINICAL SUPPORT (OUTPATIENT)
Dept: OBSTETRICS AND GYNECOLOGY | Facility: CLINIC | Age: 35
End: 2025-08-12
Payer: COMMERCIAL

## 2025-08-12 DIAGNOSIS — R30.0 DYSURIA: Primary | ICD-10-CM

## 2025-08-12 DIAGNOSIS — N39.0 URINARY TRACT INFECTION WITHOUT HEMATURIA, SITE UNSPECIFIED: ICD-10-CM

## 2025-08-12 PROCEDURE — 96372 THER/PROPH/DIAG INJ SC/IM: CPT | Mod: PBBFAC | Performed by: ADVANCED PRACTICE MIDWIFE

## 2025-08-12 PROCEDURE — 99999 PR PBB SHADOW E&M-EST. PATIENT-LVL III: CPT | Mod: PBBFAC,,,

## 2025-08-12 PROCEDURE — 99999PBSHW PR PBB SHADOW TECHNICAL ONLY FILED TO HB: Mod: PBBFAC,,,

## 2025-08-12 RX ORDER — CEFTRIAXONE 1 G/1
1 INJECTION, POWDER, FOR SOLUTION INTRAMUSCULAR; INTRAVENOUS
Status: COMPLETED | OUTPATIENT
Start: 2025-08-12 | End: 2025-08-12

## 2025-08-12 RX ADMIN — CEFTRIAXONE 1 G: 1 INJECTION, POWDER, FOR SOLUTION INTRAMUSCULAR; INTRAVENOUS at 01:08

## 2025-08-14 LAB — UA COMPLETE W REFLEX CULTURE PNL UR: ABNORMAL

## 2025-08-21 DIAGNOSIS — R31.9 HEMATURIA, UNSPECIFIED TYPE: Primary | ICD-10-CM

## 2025-08-21 DIAGNOSIS — R30.0 DYSURIA: ICD-10-CM
